# Patient Record
Sex: FEMALE | Race: WHITE
[De-identification: names, ages, dates, MRNs, and addresses within clinical notes are randomized per-mention and may not be internally consistent; named-entity substitution may affect disease eponyms.]

---

## 2017-01-22 ENCOUNTER — HOSPITAL ENCOUNTER (EMERGENCY)
Dept: HOSPITAL 80 - CED | Age: 72
Discharge: HOME | End: 2017-01-22
Payer: COMMERCIAL

## 2017-01-22 VITALS
DIASTOLIC BLOOD PRESSURE: 89 MMHG | OXYGEN SATURATION: 92 % | HEART RATE: 97 BPM | TEMPERATURE: 99 F | RESPIRATION RATE: 20 BRPM | SYSTOLIC BLOOD PRESSURE: 149 MMHG

## 2017-01-22 DIAGNOSIS — J06.9: ICD-10-CM

## 2017-01-22 DIAGNOSIS — H10.32: Primary | ICD-10-CM

## 2017-01-22 NOTE — UCPHY
H & P


Time Seen by Provider: 01/22/17 10:04


Patient Type: Established


HPI/ROS: 


This patient presents with a chief complaint of left eye irritation associated 

with purulent discharge which began yesterday.  Initially she had a foreign 

body sensation but this is resolved and she has also had some blurring which is 

also resolved.  5 or 6 days ago she developed cold consisting of nasal 

congestion, sinus pressure, sore throat, ear fullness and cough.  She has had 

some chills but does not think she has had a fever.  She admits to some mild 

shortness of breath but has no chest pain.





REVIEW OF SYSTEMS:


Constitutional:  Fatigue, malaise, chills, no fever


Eyes:  See above


ENT:  Sore throat, ear pressure, nasal congestion, sinus pressure


Respiratory:  Cough, mild shortness of breath


Cardiac:  No chest pain


Gastrointestinal:  Not addressed 


Genitourinary:  Not addressed


Musculoskeletal:  Myalgias


Skin:  No rash


Neurological:  Headache


Smoking Status: Never smoked


Physical Exam: 


GENERAL:  Well-appearing, well-nourished and in no acute distress. The patient 

is mildly hoarse


HEAD:  Atraumatic, normocephalic.


EYES:  Pupils equal round and reactive to light, extraocular movements intact, 

sclera anicteric, left conjunctiva is injected.  Examination with the 

ophthalmoscope showed no corneal abrasion and no foreign body.


ENT:  TMs normal, nares congested, oropharynx clear without exudates but with 

mild injection.  Moist mucous membranes.


NECK:  Normal range of motion, supple without lymphadenopathy or JVD.


LUNGS:  Breath sounds clear to auscultation bilaterally and equal.  No wheezes 

rales or rhonchi.


HEART:  Regular rate and rhythm 


EXTREMITIES:  Normal range of motion, no pitting or edema.  No clubbing or 

cyanosis.


NEUROLOGICAL:  Cranial nerves II through XII grossly intact.  Normal speech, 

normal gait.


PSYCH:  Normal mood, normal affect.


SKIN:  Warm, dry, normal turgor, no visible rashes or lesions.


Constitutional: 





 Initial Vital Signs











Temperature (C)  37.2 C   01/22/17 10:00


 


Heart Rate  97   01/22/17 10:00


 


Respiratory Rate  20   01/22/17 10:00


 


Blood Pressure  149/89 H  01/22/17 10:00


 


O2 Sat (%)  92   01/22/17 10:00








 











O2 Delivery Mode               Room Air














Allergies/Adverse Reactions: 


 





simvastatin [From Zocor] Allergy (Unknown, Verified 01/22/17 09:58)


 MUSCLE PAIN








Home Medications: 














 Medication  Instructions  Recorded


 


Ascorbic Acid [Vitamin C 500 mg 500 mg PO DAILY 03/27/13





(*)]  


 


Atorvastatin Calcium [Lipitor 10 10 mg PO HS 03/27/13





mg (*)]  


 


Levothyroxine Sodium [Levothroid] 50 mcg PO DAILY 03/27/13


 


Losartan Potassium [Cozaar 50 mg 50 mg PO DAILY 03/27/13





(*)]  


 


Omeprazole [Prilosec 20 mg] 20 mg PO DAILY 03/27/13


 


Pregabalin [LYRICA] 100 mg PO TID 03/27/13


 


Acyclovir [Zovirax 400 mg (*)] 800 mg PO BID 05/14/14


 


Zolpidem Tartrate [Ambien 5MG (*)] 5 mg PO HS PRN 05/21/14


 


Aspirin [Aspirin 81mg (*)] 81 mg PO BID #28 tab 06/06/14


 


Ambien 10 mg  07/30/14


 


Cozaar  07/30/14


 


Lipitor 10 mg (RX)  07/30/14


 


Lyrica  07/30/14


 


Omeprazole  07/30/14


 


Synthroid  07/30/14


 


Coq-10  03/31/16


 


Fish Oil  03/31/16


 


Multi-Day Vitamins  03/31/16


 


Vitamin K  03/31/16


 


Gentamicin 0.3% [Gentak 0.3%] 2 drops LEFTEYE Q4H #1 opht.btl 01/22/17














Medical Decision Making


Differential Diagnosis: 


I believe this patient has a conjunctivitis probably viral in origin.  I 

believe that this diagnosis explains her eye symptoms.





Departure





- Departure


Disposition: Home, Routine, Self-Care


Clinical Impression: 


Conjunctivitis


Qualifiers:


 Conjunctivitis type: acute Acute conjunctivitis type: unspecified Laterality: 

left Qualifier Code: (H10.32) Unspecified acute conjunctivitis, left eye





Upper respiratory infection


Qualifiers:


 URI type: unspecified URI Qualifier Code: (J06.9) Acute upper respiratory 

infection, unspecified


Condition: Good


Instructions:  Conjunctivitis (ED), Upper Respiratory Infection (ED)


Additional Instructions: 


If your symptoms improved in another 5 or 6 days you should be re-evaluated.  

Keep your previously scheduled appointment with ear nose and throat specialist.


Referrals: 


Kim Leiva MD [Primary Care Provider] - As per Instructions


Prescriptions: 


Gentamicin 0.3% [Gentak 0.3%] 2 drops LEFTEYE Q4H #1 opht.btl





- PQRS


PQRS Measurement: 


Not applicable

## 2017-03-15 ENCOUNTER — HOSPITAL ENCOUNTER (OUTPATIENT)
Dept: HOSPITAL 80 - BHFA | Age: 72
End: 2017-03-15
Attending: INTERNAL MEDICINE
Payer: COMMERCIAL

## 2017-03-15 DIAGNOSIS — E78.5: ICD-10-CM

## 2017-03-15 DIAGNOSIS — I47.1: Primary | ICD-10-CM

## 2017-03-15 DIAGNOSIS — I10: ICD-10-CM

## 2017-05-04 ENCOUNTER — HOSPITAL ENCOUNTER (OUTPATIENT)
Dept: HOSPITAL 80 - BMCIMAGING | Age: 72
End: 2017-05-04
Attending: INTERNAL MEDICINE
Payer: COMMERCIAL

## 2017-05-04 DIAGNOSIS — J45.909: Primary | ICD-10-CM

## 2017-05-17 ENCOUNTER — HOSPITAL ENCOUNTER (OUTPATIENT)
Dept: HOSPITAL 80 - FCATH | Age: 72
Setting detail: OBSERVATION
LOS: 1 days | Discharge: HOME | End: 2017-05-18
Attending: INTERNAL MEDICINE | Admitting: INTERNAL MEDICINE
Payer: COMMERCIAL

## 2017-05-17 DIAGNOSIS — I47.1: Primary | ICD-10-CM

## 2017-05-17 DIAGNOSIS — I10: ICD-10-CM

## 2017-05-17 DIAGNOSIS — Z94.84: ICD-10-CM

## 2017-05-17 DIAGNOSIS — G62.9: ICD-10-CM

## 2017-05-17 DIAGNOSIS — Z85.79: ICD-10-CM

## 2017-05-17 DIAGNOSIS — E78.5: ICD-10-CM

## 2017-05-17 DIAGNOSIS — E03.9: ICD-10-CM

## 2017-05-17 DIAGNOSIS — K21.9: ICD-10-CM

## 2017-05-17 LAB
% IMMATURE GRANULYOCYTES: 0.2 % (ref 0–1.1)
ABSOLUTE IMMATURE GRANULOCYTES: 0.01 10^3/UL (ref 0–0.1)
ABSOLUTE NRBC COUNT: 0 10^3/UL (ref 0–0.01)
ADD DIFF?: NO
ADD MORPH?: NO
ADD SCAN?: NO
ANION GAP SERPL CALC-SCNC: 7 MEQ/L (ref 8–16)
ANION GAP SERPL CALC-SCNC: 8 MEQ/L (ref 8–16)
APTT BLD: 26.9 SEC (ref 23–38)
ATYPICAL LYMPHOCYTE FLAG: 10 (ref 0–99)
CALCIUM SERPL-MCNC: 7.7 MG/DL (ref 8.5–10.4)
CALCIUM SERPL-MCNC: 8.8 MG/DL (ref 8.5–10.4)
CHLORIDE SERPL-SCNC: 107 MEQ/L (ref 97–110)
CHLORIDE SERPL-SCNC: 110 MEQ/L (ref 97–110)
CHOLEST SERPL-MCNC: 165 MG/DL (ref 140–220)
CHOLEST/HDLC SERPL: 3.17 RATIO (ref 1–4.44)
CO2 SERPL-SCNC: 24 MEQ/L (ref 22–31)
CO2 SERPL-SCNC: 27 MEQ/L (ref 22–31)
CREAT SERPL-MCNC: 0.8 MG/DL (ref 0.6–1)
CREAT SERPL-MCNC: 0.9 MG/DL (ref 0.6–1)
ERYTHROCYTE [DISTWIDTH] IN BLOOD BY AUTOMATED COUNT: 13 % (ref 11.5–15.2)
FRAGMENT RBC FLAG: 0 (ref 0–99)
GFR SERPL CREATININE-BSD FRML MDRD: > 60 ML/MIN/{1.73_M2}
GFR SERPL CREATININE-BSD FRML MDRD: > 60 ML/MIN/{1.73_M2}
GLUCOSE SERPL-MCNC: 143 MG/DL (ref 70–100)
GLUCOSE SERPL-MCNC: 88 MG/DL (ref 70–100)
HCT VFR BLD CALC: 35.8 % (ref 38–47)
HGB BLD-MCNC: 11.7 G/DL (ref 12.6–16.3)
HIGH DENSITY LIPOPROTEIN: 52 MG/DL (ref 40–85)
INR PPP: 0.98 (ref 0.83–1.16)
LDLC/HDLC SERPL: 1.65 RATIO (ref 1–3.22)
LEFT SHIFT FLG: 0 (ref 0–99)
LIPEMIA HEMOLYSIS FLAG: 80 (ref 0–99)
LOW DENSITY LIPOPROTEIN: 86 MG/DL (ref 80–100)
MAGNESIUM SERPL-MCNC: 2 MG/DL (ref 1.6–2.3)
MAGNESIUM SERPL-MCNC: 2.2 MG/DL (ref 1.6–2.3)
MCH RBC BLDCO QN: 34.9 PG (ref 27.9–34.1)
MCHC RBC AUTO-ENTMCNC: 32.7 G/DL (ref 32.4–36.7)
MCV RBC AUTO: 106.9 FL (ref 81.5–99.8)
NON-HIGH DENSITY LIPOPROTEIN: 113 MG/DL (ref 90–129)
NRBC-AUTO%: 0 % (ref 0–0.2)
PLATELET # BLD: 179 10^3/UL (ref 150–400)
PLATELET CLUMPS FLAG: 10 (ref 0–99)
PMV BLD AUTO: 10.2 FL (ref 8.7–11.7)
POTASSIUM SERPL-SCNC: 4 MEQ/L (ref 3.5–5.2)
POTASSIUM SERPL-SCNC: 4.1 MEQ/L (ref 3.5–5.2)
PROTHROMBIN TIME: 12.9 SEC (ref 12–15)
RBC # BLD AUTO: 3.35 10^6/UL (ref 4.18–5.33)
SODIUM SERPL-SCNC: 141 MEQ/L (ref 134–144)
SODIUM SERPL-SCNC: 142 MEQ/L (ref 134–144)
TRIGL SERPL-MCNC: 136 MG/DL (ref 35–135)
VERY LOW DENSITY LIPOPROTEINS: 27 MG/DL (ref 8–25)

## 2017-05-17 PROCEDURE — C1893 INTRO/SHEATH, FIXED,NON-PEEL: HCPCS

## 2017-05-17 PROCEDURE — 93653 COMPRE EP EVAL TX SVT: CPT

## 2017-05-17 PROCEDURE — 93655 ICAR CATH ABLTJ DSCRT ARRHYT: CPT

## 2017-05-17 PROCEDURE — 93005 ELECTROCARDIOGRAM TRACING: CPT

## 2017-05-17 PROCEDURE — 93662 INTRACARDIAC ECG (ICE): CPT

## 2017-05-17 PROCEDURE — C1730 CATH, EP, 19 OR FEW ELECT: HCPCS

## 2017-05-17 PROCEDURE — 93623 PRGRMD STIMJ&PACG IV RX NFS: CPT

## 2017-05-17 PROCEDURE — C1731 CATH, EP, 20 OR MORE ELEC: HCPCS

## 2017-05-17 PROCEDURE — 93306 TTE W/DOPPLER COMPLETE: CPT

## 2017-05-17 PROCEDURE — C1732 CATH, EP, DIAG/ABL, 3D/VECT: HCPCS

## 2017-05-17 PROCEDURE — 93621 COMP EP EVL L PAC&REC C SINS: CPT

## 2017-05-17 RX ADMIN — PREGABALIN SCH MG: 50 CAPSULE ORAL at 21:01

## 2017-05-17 NOTE — CPEKG
Heart Rate: 65

RR Interval: 923

P-R Interval: 144

QRSD Interval: 82

QT Interval: 396

QTC Interval: 412

P Axis: 21

QRS Axis: 23

T Wave Axis: 19

EKG Severity - NORMAL ECG -

EKG Impression: SINUS RHYTHM

Electronically Signed By: Christal Rowell 17-May-2017 10:01:56

## 2017-05-17 NOTE — EPPROC
Electrophysiology Procedure Note: 


ELECTROPHYSIOLOGIC STUDY AND CATHETER MEDIATED ABLATION OF  LEFT POSTEROLATERAL 

ACCESSORY PATHWAY AND TYPICAL AVNRT








Procedures performed:





15582-82    EP evaluation with RA/RV/LA pace/record, with arrhythmia induction





56153-97    EP evaluation with RA/RV pace record, insert/reposition catheter, 

with arrhythmia induction





67586   Intracardiac catheter ablation, SVT arrhythmogenic focus 





40031   3D mapping





2nd arrhythmia





Fluoroscopy





22976   Intracardiac echocardiogram





76310-00   Transseptal puncture








INDICATION:





Supraventricular tachycardia





PROCEDURE:





Catheters and anesthesia:





The patient arrived in the Electrophysiology Laboratory in the fasting state.  

The right clavicular region, right groin, and left groin area were prepped and 

draped in the usual sterile manner.  Anesthesiologist Dr. Jason Garner 

administered general anesthesia.  Appropriate non-invasive blood pressure, 

pulse oximetry and end-tidal CO2 monitoring was established.


All catheters were placed percutaneously using the modified Seldinger technique

, and advanced into position under fluoroscopic guidance. One #6 Dominican 

hexapolar non-deflectable electrode catheter was inserted into the right atrial 

appendage via the left femoral vein (2mm spacing; except the proximal ring 

which was 25cm from the tip  used for unipolar recordings).  One #7 Dominican 

deflectable octapolar electrode catheter was advanced to the His-bundle 

position via the left femoral vein (2mm spacing).  One #7 Dominican deflectable 

quadrapolar catheter was advanced to the anteroseptal right ventricle via the 

left femoral vein.  One #7 Dominican deflectable catheter with 10 pairs of 

electrodes was placed via the right femoral vein into the coronary sinus.   


Programmed stimulation of the right atrium, right ventricle and coronary sinus (

left atrium) was performed. Parahisian pacing demonstrated two patterns of 

retrograde atrial activation during His bundle capture and loss of His bundle 

capture.   This demonstrated the presence of an accessory pathway ( 0500 oclock 

at the mitral annulus as seen in the Croatian view).   Orthodromic AV reentrant 

tachycardia was induced during isoproterenol infusion 2 mics per minute.  

Ventricular extrastimuli delivered during tachycardia during His bundle 

refractoriness terminated the tachycardia to be orthodromic AV reentrant 

tachycardia.  





Typical AVNRT was also induced, VA interval was 0 milliseconds during 

tachycardia.  Tachycardia started with long AH interval.  There was 

intermittent 2-1 conduction during AVNRT confirming that this was not 

orthodromic AVRT.





In preparation for ablation of the left posterior accessory pathway a 

transeptal puncture was performed under fluoroscopic and hemodynamic guidance. 

Intracardiac echocardiography was used during the procedure.  Mean left atrial 

pressure was 18 mm Hg mmHg.  A SL1 sheath was inserted into the left atrium. 





The patient was administered IV heparin bolus and IV heparin continuous 

infusion prior to the transseptal puncture and the activated clotting time was 

maintained ~ 300 seconds during the remainder of the procedure.  





One #7 Dominican mapping catheter with a 4 mm tip electrode and a sensor  for the 

[id number] mapping system was inserted into the SL1 sheath and advanced to the 

left atrium.  Mapping was perfomed during ventricular pacing.  A sharp 

retrograde grade accessory pathway potential was recorded at 0500 oclock at the 

mitral annulus as seen in the [place] view.  This preceded the earliest atrial 

activation by 10 ms.   RF#1 was delivered to this site.   RF#1 blocked 

conduction in the accessory pathway after 8 seconds of initiation of ablation.  

There was change in activation pattern in the coronary sinus and a 2nd RF 

ablation slightly more anterior to the site of our phone terminated all 

conduction over the accessory pathway Additional RF 3-5 were delivered slightly 

anterior and posterior to RF1 site.





Following this attention was directed to ablation for AVNRT.  Sheath and 

catheter were withdrawn into the right atrium.  Ablation was performed at the 

upper edge of the coronary sinus and low mid septal tricuspid annulus.  

Junctional rhythm occurred continuously.  There was no slow pathway conduction 

post ablation.





Programmed stimulation from the RA, CS, right  ventricle during the baseline 

state post ablation and during infusion of isoproterenol 2 and 4 mcg/min 

confirmed that there was no conduction over the left posterior lateral 

accessory pathway and no orthodromic AVRT or AVNRT could be induced.   





The catheters were removed.  Long sheath was exchanged for short sheath.  

Protamine was administered.  The patient was transferred to the cardiovascular 

holding area in stable condition.  Vascular access sheaths were removed in the 

holding area.  There were no apparent complications.








Results:





A.   Spontaneous Intervals:


Pre ablation    SCL 12 90 ms AH 65 ms HV 40 ms


Post ablation   ms AH 55 ms HV 40 ms





B.   Antegrade AV zackary function (decremental pacing)


Pre ablation    FPERP 390 ms SPERP 380 ms WBB CL 3 70 ms


Post ablation    FPERP 380 ms     WBB  ms





C.   Retrograde AV zackary function (decremental pacing)


 Post ablation    FPERP 380 ms   WBB  ms





D.   Accessory pathway function


1.   A single AV accessory pathway was identified at the mitral annulus at 0500 

oclock as seen in the Croatian view.


2.   The AV accessory pathway conducted in the antegrade and retrograde 

directions.  During ventricular pacing 1:1 conduction over the accessory 

pathway was maintained to a cycle length of 330 ms, block over the AP occurred 

at 320 ms.





E.   Arrhythmias:





1.   Orthodromic AVRT   ms  ms HV 40 ms  ms, shortest VA at 

posterolateral  ms


2.   Typical AVNRT   ms  ms, HA 45 ms VA interval 0 ms





CONCLUSIONS:





1.   A single left posterolateral accessory pathway, which exhibited conduction 

in the retrograde direction.


2.   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 

accessory pathway.


3.   Successful ablation of the left posterolateral accessory pathway with 

elimination of AV reentrant tachycardia.


4.   Typical AV zackary reentry tachycardia.  Successful ablation of slow AV 

zackary pathway and elimination of antegrade conduction over slow AV zackary 

pathway and inducibility of AVNRT.  


5.   No apparent complications.








Patient Problems: 


 Problems











Problem Status Onset


 


SVT (supraventricular tachycardia) Acute  


 


Primary osteoarthritis of left knee Acute

## 2017-05-17 NOTE — CPEKG
Heart Rate: 71

RR Interval: 845

P-R Interval: 140

QRSD Interval: 82

QT Interval: 400

QTC Interval: 435

P Axis: 39

QRS Axis: 28

T Wave Axis: 19

EKG Severity - NORMAL ECG -

EKG Impression: SINUS RHYTHM

Electronically Signed By: Christal Rowell 17-May-2017 15:44:21

## 2017-05-18 VITALS
SYSTOLIC BLOOD PRESSURE: 127 MMHG | TEMPERATURE: 98 F | DIASTOLIC BLOOD PRESSURE: 72 MMHG | OXYGEN SATURATION: 96 % | HEART RATE: 68 BPM | RESPIRATION RATE: 20 BRPM

## 2017-05-18 LAB
% IMMATURE GRANULYOCYTES: 0.3 % (ref 0–1.1)
ABSOLUTE IMMATURE GRANULOCYTES: 0.03 10^3/UL (ref 0–0.1)
ABSOLUTE NRBC COUNT: 0 10^3/UL (ref 0–0.01)
ADD DIFF?: NO
ADD MORPH?: NO
ADD SCAN?: NO
ANION GAP SERPL CALC-SCNC: 6 MEQ/L (ref 8–16)
ATYPICAL LYMPHOCYTE FLAG: 10 (ref 0–99)
CALCIUM SERPL-MCNC: 8.2 MG/DL (ref 8.5–10.4)
CHLORIDE SERPL-SCNC: 109 MEQ/L (ref 97–110)
CO2 SERPL-SCNC: 24 MEQ/L (ref 22–31)
CREAT SERPL-MCNC: 0.8 MG/DL (ref 0.6–1)
CREATINE KINASE-MB FRACTION: 2 NG/ML (ref 0–3.19)
ERYTHROCYTE [DISTWIDTH] IN BLOOD BY AUTOMATED COUNT: 12.6 % (ref 11.5–15.2)
FRAGMENT RBC FLAG: 0 (ref 0–99)
GFR SERPL CREATININE-BSD FRML MDRD: > 60 ML/MIN/{1.73_M2}
GLUCOSE SERPL-MCNC: 111 MG/DL (ref 70–100)
HCT VFR BLD CALC: 29.7 % (ref 38–47)
HGB BLD-MCNC: 9.7 G/DL (ref 12.6–16.3)
LEFT SHIFT FLG: 0 (ref 0–99)
LIPEMIA HEMOLYSIS FLAG: 80 (ref 0–99)
MCH RBC BLDCO QN: 35 PG (ref 27.9–34.1)
MCHC RBC AUTO-ENTMCNC: 32.7 G/DL (ref 32.4–36.7)
MCV RBC AUTO: 107.2 FL (ref 81.5–99.8)
NRBC-AUTO%: 0 % (ref 0–0.2)
PLATELET # BLD: 160 10^3/UL (ref 150–400)
PLATELET CLUMPS FLAG: 0 (ref 0–99)
PMV BLD AUTO: 10.7 FL (ref 8.7–11.7)
POTASSIUM SERPL-SCNC: 4.8 MEQ/L (ref 3.5–5.2)
RBC # BLD AUTO: 2.77 10^6/UL (ref 4.18–5.33)
SODIUM SERPL-SCNC: 139 MEQ/L (ref 134–144)
TROPONIN I SERPL-MCNC: 0.88 NG/ML (ref 0–0.03)

## 2017-05-18 RX ADMIN — PREGABALIN SCH MG: 50 CAPSULE ORAL at 08:06

## 2017-05-18 NOTE — GDS
[f 
rep st]



                                                             DISCHARGE SUMMARY





ADMISSION DIAGNOSES:  

1.  Supraventricular tachycardia.

2.  Hypertension.

3.  Hyperlipidemia.

4.  Peripheral neuropathy.



DISCHARGE DIAGNOSES:  

1.  Supraventricular tachycardia, status post electrophysiology study and with 
successful ablation of left posterolateral accessary pathway which eliminated 
arteriovenous reentry tachycardia. Patient also had a successful ablation of 
slow arteriovenous zackary pathway, eliminating an anterograde conduction over 
slow arteriovenous zackary pathway, inducible of arteriovenous zackary reentrant 
tachycardia.

2.  Hypertension.

3.  Hyperlipidemia.

4.  Peripheral neuropathy.



PROCEDURES DONE DURING HOSPITALIZATION:  

1.  Electrocardiogram.

2.  Electrophysiology study.

3.  Successful ablation of left posterior accessary pathway, which eliminated 
arteriovenous reentry tachycardia.

4.  Successful ablation of slow arteriovenous zackary pathway and elimination of 
antegrade conduction over slow arteriovenous zackary pathway and inducible 
arteriovenous zackary reentrant tachycardia.

5.  Echocardiogram.



BRIEF HISTORY:  Please see H and P. The patient is a 71-year-old female who has 
had known episodes of SVT since 2004. She had been tried on beta blockers in 
the past, reporting fatigue symptoms, and discontinued. She had recently had 
another episode of SVT, lasting for 1-1/2 hours. Paramedics were called, she 
did have significant shortness of breath and dizziness with them. It was 
recorded by paramedic service that her heart rate was 185 beats per minute. 
Adenosine was given.



PAST MEDICAL HISTORY:  She was evaluated by Dr. Tyson, who discussed all options, 
including medical management versus ablation. Risks and benefits were 
discussed. Patient decided to undergo ablation.



HOSPITAL COURSE:  Patient was admitted through CVC, prepped for procedure, and 
taken to the electrophysiology suite. There, Dr. Tyson performed EP procedure, 
noticing a single left posterior lateral axis pathway which exhibited 
conduction in retrograde direction,   AV reentry tachycardia using left 
posterior lateral accessary pathway, and typical AV zackary reentry tachycardia. 
At that point, procedure was switched to intervention, in which Dr. Tyson was 
able to successfully ablate the left posterior accessary pathway, which 
eliminated the AV reentry tachycardia, and successfully ablated the slow AV 
zackary pathway, eliminating the antegrade conduction over slow AV zackary pathway 
and inducible AVNRT. No apparent complications. Patient was taken back to the 
CVC and ultimately to the PCU overnight.Patient reports, since arrival to the 
floor, she has had no chest pain or shortness of breath. She has been up and 
walking in the unit without any difficulties. She denies any palpitations, 
orthopnea, lightheadedness, near-syncope, or syncopal events. Continuous 
cardiac monitor shows she has been in sinus rhythm with no malignant 
arrhythmias or pauses.



PHYSICAL EXAMINATION:  GENERAL APPEARANCE: Today thin, well-groomed,  
female. She is alert and oriented to person, place, time, and situation. 
Appears to be under no acute distress. VITAL SIGNS: Current vital signs are 
blood pressure of 127/72, heart rate 68, respirations 20, saturation 96 in room 
air, and temperature 36.7 degrees Celsius. HEENT: Head is normocephalic. Lips 
and tongue are pink and moist with no signs of cyanosis. Conjunctivae pink. NECK
: Trachea is midline, +2 carotid pulses bilateral. No auscultated bruits, no 
jugular vein distention. RESPIRATORY: Lungs clear to auscultation. No rhonchi, 
rales or wheezes. No accessory muscle use. No intercostal muscle retraction 
noted. CARDIAC: Regular rate, regular rhythm. S1, S2. No S3, S4, gallops, rubs, 
or murmur. ABDOMEN: Soft, nontender. Bowel sounds x4 quadrants. No 
organomegaly. No palpable masses. SKIN: Pink, warm, dry. No cyanosis. No 
clubbing. No peripheral edema. VASCULAR: +2 carotids bilateral, +2 radials 
bilateral, +2 posterior tibial pulses bilateral. SKIN: Pink. Catheter insertion 
site, bilateral groin sites, with no redness, swelling, drainage, ecchymosis, 
or hematoma. No auscultated bruits.



LABORATORY STUDIES (Drawn today): WBC of 8.72, hemoglobin 9.7, hematocrit of 
29.7, platelet count 160. Sodium 139, potassium 4.8, chloride 109, CO2 24, BUN 
14, creatinine 0.8, glucose 111, calcium 8.2. CK 56, CK-MB 2.0, troponin 0.881. 
Note, expected to have elevated cardiac enzymes status post ablation procedure.



STUDIES:  Electrophysiology and ablation procedures as mentioned above. Morning 
electrocardiogram shows sinus rhythm, normal axis; no ST or T-wave 
abnormalities suggestive of ischemia. Morning echocardiogram showed normal LV 
systolic, ejection fraction 70% to 75%, with no wall motion abnormalities. 
Diastolic dysfunction was present. Normal RV size and function. Mild LA 
dilation with normal RA dimensions. Mild MR, moderate TR. RVSP estimated at 35-
40 mmHg. Borderline dilation of ascending aorta at 3.4 cm. No pericardial 
effusion.



DISCHARGE DISPOSITION:  Patient will be discharged home in stable condition. 
She is under activity restrictions of not lifting more than 10 pounds for the 
next week and no strenuous activity for the next 2 weeks.



DISCHARGE MEDICATIONS:  Please see discharge medication reconciliation sheet.



DISCHARGE INSTRUCTIONS:  Post electrophysiology status post SVT ablation 
discharge instructions went over with the patient, including monitoring for 
signs of infection, bleeding precautions, activity restrictions, and DVT 
precautions. At this time, the patient and friend verbalize understanding of 
all instructions. No further questions at this time. They have been told that 
if there any problems or concerns that come up post discharge, they are to 
notify our office or seek medical attention immediately.



TOTAL TIME SPENT ON DISCHARGE:  Greater than 30 minutes.





Job #:  [id number] and [id number]

MTDD

## 2017-05-18 NOTE — CPEKG
Heart Rate: 79

RR Interval: 759

P-R Interval: 132

QRSD Interval: 86

QT Interval: 376

QTC Interval: 432

P Axis: 4

QRS Axis: 52

T Wave Axis: 26

EKG Severity - NORMAL ECG -

EKG Impression: SINUS RHYTHM

Electronically Signed By: Christal Rowell 18-May-2017 12:10:49

## 2017-05-18 NOTE — ECHO
[id number]

[id number]



+---------------------------------------------------+      [address]

:                                                   :       [place] [address]

:                                                   :           [phone]

+---------------------------------------------------+       Fax [phone]



                       Adult Echocardiographic Report



+----------------------------------------------------------------------------

------+

:Name: CASA HWANG LStudy Date: 2017 08:13 AM                       

      :

:MRN: [id number]       [facility]: [id number]

n: [facility]:

:: 1945       Gender: Female                         Height: 64 in  

      :

:Age: 71 yrs           Race: WH                               Weight: 123 lb 

      :

:Reason For Study: F/U post EP study                                         

      :

:                                                             BSA: 1.6 meters

2     :

+----------------------------------------------------------------------------

------+

MMode/2D Measurements \T\ Calculations

IVSd: 0.87 cm       LVIDd: 4.3 cm      FS: 42.1 %        Ao root diam:

LVPWd: 0.85 cm      LVIDs: 2.5 cm      EDV(Teich):       3.4 cm

                                       82.3 ml           LA dimension:

                                       ESV(Teich):       3.1 cm

                                       21.8 ml

                                       EF(Teich): 73.5 %

        _____________________________________________________________



LVLd ap4: 6.7 cm    SV(MOD-sp4):

EDV(MOD-sp4):       34.0 ml

53.0 ml

LVLs ap4: 5.2 cm

ESV(MOD-sp4):

19.0 ml

EF(MOD-sp4): 64.2 %



Normal Measurement Values:

+----------------------------------------------------------------------------

----------------------------------+

:LVIDd (3.5-5.7cm)    IVSd (0.6-1.1cm)     LVPWd (0.6-1.1cm)     Aortic Root 

(2.0-3.7cm)Left Atrium (1.5-4.0cm):

:LV Vol(d) (76-115ml) LV Vol(s) (29-48ml)  Ejec Fraction (50-65%)PV Anuj (0.6-

1.2m/s)    TV Anuj (0.4-1.0m/s)    :

:MV E Anuj (0.8-1.0m/s)MV A Anuj (0.3-1.0m/s)LVOT Anuj (0.7-1.2m/s) Asc Ao Anuj (

0.9-1.8m/s)                       :

+----------------------------------------------------------------------------

----------------------------------+

Doppler Measurements \T\ Calculations

MV E max anuj: 74.5 cm/sec  Ao V2 max: 134.2 cm/sec TR max anuj: 281.0 cm/sec

MV A max anuj: 103.2 cm/sec Ao max P.2 mmHg     TR max P.6 mmHg

MV E/A: 0.72                                       RAP systole: 5.0 mmHg

                                                   RVSP(TR): 36.6 mmHg



Left Ventricle

The left ventricle is normal in size. There is normal left ventricular wall

thickness. Left ventricular systolic function is normal. Ejection Fraction =

70-75%. The left ventricular ejection fraction is calculated at 73.5 %.

There is Doppler evidence for diastolic dysfunction. No regional wall motion

abnormalities noted.





Right Ventricle

The right ventricle is normal in size and function.



Atria

The left atrium is mildly dilated. Right atrial size is normal.





Mitral Valve

The mitral valve is normal in structure and function. There is mild mitral

regurgitation.



Tricuspid Valve

Normal tricuspid valve. There is moderate tricuspid regurgitation. Right

ventricular systolic pressure is 37-42mmHg.



Aortic Valve

The aortic valve is trileaflet. The aortic valve opens well. There is no

aortic stenosis. There is no aortic insufficiency.



Pulmonic Valve

The pulmonic valve is not well visualized. Mild pulmonic valvular

regurgitation.



Great Vessels

The aortic root is normal size. Borderline dilated ascending aorta.





Pericardium/Pleural

There is no pericardial effusion.



Conclusion

A complete two-dimensional transthoracic echocardiogram was performed (2D,

M-mode, Doppler and color flow Doppler).



(1) Left ventricular systolic ejection fraction was normal (70-75%)

- normal wall motion

(2) No left ventricular hypertrophy

(3) Diastolic dysfunction was present

(4) Normal right ventricular size and function

(5) Mild left atrial dilation with normal right atrial dimensions

(6) Mild mitral regurgitation

(7) Trileaflet aortic valve without sclerosis or insufficiency

(8) Moderate tricuspid regurgitation

- RVSP was estimated to be 35-40 mm Hg

(9) Poor visualization of the pulmonic valve with physiologic insufficiency

by doppler

(10) Borderline dilation of the ascending aorta (3.4 cm)

(11) No comparison echocardiograms





_____________________________________________________________________________







Final Reading Physician:

                        Jud Bartholomew signed on 2017 04:13 PM

Ordering Physician: Jamie Tyson

Performed By: Massiel Mobley, CS

## 2017-06-05 ENCOUNTER — HOSPITAL ENCOUNTER (OUTPATIENT)
Dept: HOSPITAL 80 - BMCIMAGING | Age: 72
End: 2017-06-05
Attending: INTERNAL MEDICINE
Payer: COMMERCIAL

## 2017-06-05 DIAGNOSIS — Z13.820: Primary | ICD-10-CM

## 2017-06-21 ENCOUNTER — HOSPITAL ENCOUNTER (OUTPATIENT)
Dept: HOSPITAL 80 - BHFA | Age: 72
End: 2017-06-21
Attending: INTERNAL MEDICINE
Payer: COMMERCIAL

## 2017-06-21 DIAGNOSIS — I47.1: Primary | ICD-10-CM

## 2017-08-10 ENCOUNTER — HOSPITAL ENCOUNTER (OUTPATIENT)
Dept: HOSPITAL 80 - FIMAGING | Age: 72
End: 2017-08-10
Attending: INTERNAL MEDICINE
Payer: COMMERCIAL

## 2017-08-10 DIAGNOSIS — Z80.3: ICD-10-CM

## 2017-08-10 DIAGNOSIS — Z12.31: Primary | ICD-10-CM

## 2017-08-10 PROCEDURE — G0202 SCR MAMMO BI INCL CAD: HCPCS

## 2018-06-26 ENCOUNTER — HOSPITAL ENCOUNTER (OUTPATIENT)
Dept: HOSPITAL 80 - FIMAGING | Age: 73
End: 2018-06-26
Attending: PHYSICAL MEDICINE & REHABILITATION
Payer: COMMERCIAL

## 2018-06-26 DIAGNOSIS — M99.73: ICD-10-CM

## 2018-06-26 DIAGNOSIS — M12.88: ICD-10-CM

## 2018-06-26 DIAGNOSIS — M51.36: ICD-10-CM

## 2018-06-26 DIAGNOSIS — S33.130A: ICD-10-CM

## 2018-06-26 DIAGNOSIS — M41.86: Primary | ICD-10-CM

## 2018-06-26 DIAGNOSIS — M51.26: ICD-10-CM

## 2018-06-26 DIAGNOSIS — M48.061: ICD-10-CM

## 2018-08-22 ENCOUNTER — HOSPITAL ENCOUNTER (OUTPATIENT)
Age: 73
End: 2018-08-22
Payer: COMMERCIAL

## 2018-09-26 ENCOUNTER — HOSPITAL ENCOUNTER (OUTPATIENT)
Dept: HOSPITAL 80 - BHFA | Age: 73
End: 2018-09-26
Attending: INTERNAL MEDICINE
Payer: COMMERCIAL

## 2018-09-26 DIAGNOSIS — R94.39: ICD-10-CM

## 2018-09-26 DIAGNOSIS — R07.9: Primary | ICD-10-CM

## 2018-09-26 PROCEDURE — A9500 TC99M SESTAMIBI: HCPCS

## 2018-09-26 PROCEDURE — 93017 CV STRESS TEST TRACING ONLY: CPT

## 2018-09-26 PROCEDURE — 78452 HT MUSCLE IMAGE SPECT MULT: CPT

## 2018-10-22 ENCOUNTER — HOSPITAL ENCOUNTER (OUTPATIENT)
Dept: HOSPITAL 80 - FIMAGING | Age: 73
End: 2018-10-22
Attending: NURSE PRACTITIONER
Payer: COMMERCIAL

## 2018-10-22 DIAGNOSIS — Z98.1: ICD-10-CM

## 2018-10-22 DIAGNOSIS — M54.2: Primary | ICD-10-CM

## 2018-10-25 ENCOUNTER — HOSPITAL ENCOUNTER (OUTPATIENT)
Dept: HOSPITAL 80 - FIMAGING | Age: 73
End: 2018-10-25
Attending: INTERNAL MEDICINE
Payer: COMMERCIAL

## 2018-10-25 DIAGNOSIS — R05: Primary | ICD-10-CM

## 2018-12-14 ENCOUNTER — HOSPITAL ENCOUNTER (OUTPATIENT)
Dept: HOSPITAL 80 - FIMAGING | Age: 73
End: 2018-12-14
Attending: INTERNAL MEDICINE
Payer: COMMERCIAL

## 2018-12-14 DIAGNOSIS — I25.10: Primary | ICD-10-CM

## 2018-12-14 DIAGNOSIS — I47.1: ICD-10-CM

## 2018-12-14 DIAGNOSIS — I10: ICD-10-CM

## 2018-12-14 PROCEDURE — 75574 CT ANGIO HRT W/3D IMAGE: CPT

## 2019-03-06 ENCOUNTER — HOSPITAL ENCOUNTER (OUTPATIENT)
Dept: HOSPITAL 80 - FCATH | Age: 74
Setting detail: OBSERVATION
LOS: 1 days | Discharge: HOME | End: 2019-03-07
Attending: INTERNAL MEDICINE | Admitting: INTERNAL MEDICINE
Payer: COMMERCIAL

## 2019-03-06 DIAGNOSIS — I10: ICD-10-CM

## 2019-03-06 DIAGNOSIS — E78.5: ICD-10-CM

## 2019-03-06 DIAGNOSIS — I25.119: Primary | ICD-10-CM

## 2019-03-06 LAB
INR PPP: 1 (ref 0.83–1.16)
PLATELET # BLD: 227 10^3/UL (ref 150–400)
PROTHROMBIN TIME: 12.8 SEC (ref 12–15)

## 2019-03-06 PROCEDURE — G0378 HOSPITAL OBSERVATION PER HR: HCPCS

## 2019-03-06 PROCEDURE — C1874 STENT, COATED/COV W/DEL SYS: HCPCS

## 2019-03-06 PROCEDURE — 93458 L HRT ARTERY/VENTRICLE ANGIO: CPT

## 2019-03-06 PROCEDURE — C1769 GUIDE WIRE: HCPCS

## 2019-03-06 PROCEDURE — 93005 ELECTROCARDIOGRAM TRACING: CPT

## 2019-03-06 PROCEDURE — C9600 PERC DRUG-EL COR STENT SING: HCPCS

## 2019-03-06 PROCEDURE — C1887 CATHETER, GUIDING: HCPCS

## 2019-03-06 PROCEDURE — C1760 CLOSURE DEV, VASC: HCPCS

## 2019-03-06 PROCEDURE — C1725 CATH, TRANSLUMIN NON-LASER: HCPCS

## 2019-03-06 PROCEDURE — 93571 IV DOP VEL&/PRESS C FLO 1ST: CPT

## 2019-03-06 PROCEDURE — G0379 DIRECT REFER HOSPITAL OBSERV: HCPCS

## 2019-03-06 RX ADMIN — ACYCLOVIR SCH MG: 400 TABLET ORAL at 21:42

## 2019-03-06 RX ADMIN — TICAGRELOR SCH MG: 90 TABLET ORAL at 21:42

## 2019-03-06 NOTE — PDGENHP
History & Physical


Chief Complaint: chest pain


History of Present Illness: Pt is a 74 yo, f with history of HTN and 

hyperlipidemia who presented with symptoms of chest pain on exertion c/w 

angina.  She had a stress test performed demonstrating an anterior perfusion 

defect.  CT angiogram was notable for an intermediate grade LAD lesion.  Pt 

continues to have angina despite GDMT.


Pertinent Past, Social, Family History: No smoking.  rare ETOH


Relevant Physical Exam: gen - A, A, Ox3.  lungs - CTAB.  CV - RRR, S1, S3.  ext 

- No edema


Cardiorespiratory Assessment: Pt presents with exertional cp c/w angina.  She 

has an abnormal stress test with anterior ischemia.  Pt conitnues to have 

symptoms of medical therapy.  Angiogram for risk stratification.

## 2019-03-06 NOTE — CPEKG
Test Reason : OPEN

Blood Pressure : ***/*** mmHG

Vent. Rate : 058 BPM     Atrial Rate : 058 BPM

   P-R Int : 152 ms          QRS Dur : 091 ms

    QT Int : 478 ms       P-R-T Axes : 032 032 027 degrees

   QTc Int : 470 ms

 

Sinus rhythm

 

Confirmed by Jacky Samuel ([id number]) on 3/6/2019 12:24:08 PM

 

Referred By: Jacky Samuel           Confirmed By:Jacky Samuel

## 2019-03-06 NOTE — CPIP
[f rep st]



                                                       INVASIVE CARDIAC PROCEDURE





DATE OF PROCEDURE:  03/06/2019



PROCEDURES:  

1.  Coronary angiography.

2.  Left ventriculography.  

3.  FFR of left anterior descending coronary artery.  

4.  Stenting of left anterior descending coronary artery.



INDICATIONS:  

1.  Angina despite guideline directed medical therapy.

2.  Abnormal nuclear stress test with anterior ischemia.



ACCESS:  Patient was prepped and draped in sterile fashion.  1% lidocaine was used to anesthetize the
 right inguinal region.  A 6-B[place]wan[place] introducer sheath was placed selectively in the right common femo
ral artery via modified Seldinger technique.



CORONARY ANGIOGRAPHY:  A 6-B[place]wanan JL4 was advanced to left main coronary artery and images obtained. 
 The left main coronary artery trifurcated into an LAD, ramus, and circumflex coronary arteries.  The
 left main coronary artery appears normal.  The left anterior descending coronary artery had a proxim
al lesion that was intermediate in severity approaching 70% to 80%.  The ramus coronary artery is a l
arge vessel.  The ramus coronary artery appeared free of any significant disease.  The circumflex cor
onary artery is a moderate-sized vessel.  The circumflex coronary artery had mild luminal irregularit
ies throughout.  There was no stenosis greater than 10%.  A 6-B[place]wanan JR4 was advanced to the right co
ronary artery and images obtained.  The right coronary artery is dominant.  The right coronary artery
 had a proximal 20% stenosis present.



LEFT VENTRICULOGRAPHY:  A 6-B[place]wanan pigtail catheter was advanced in the left ventricle and images obt
ained.  Left ventricle is normal in size and had hyperdynamic systolic function.  Estimated ejection 
fraction was 70%.  Left ventricular end-diastolic pressure was 17 mmHg.  FFR of the left anterior jose
cending coronary artery:  A 6-B[place]wanan JL4 catheter was advanced to the left main coronary artery and i
mages obtained.  The FFR wire was advanced into the mid left anterior descending coronary artery and 
position verified by angiography.  FFR was obtained after IV adenosine infusion.  FFR was 0.79, indic
ating flow limitation.  The FFR wire was then withdrawn back to the catheter.  There was no evidence 
of drift.  Percutaneous coronary intervention of the left anterior descending coronary artery:  A Lug
e wire was placed in the distal vessel and position verified by angiography.  A 2.5 x 16 Synergy drug
-eluting stent was then placed across the lesion and deployed.  Followup angiography demonstrated JALEEL
I-3 flow with incomplete stent expansion.  The stent was post dilated with a 2.75 x 12 noncompliant b
alloon.  Followup angiography demonstrated SHIV-3 flow.  No residual stenosis.



COMPLICATIONS:  None.



CONCLUSIONS:  

1.  Single-vessel coronary artery disease.

2.  Normal left ventricular size and systolic function.

3.  Flow-limiting disease in the proximal left anterior descending coronary artery by FFR.

4.  Status post successful stenting of the left anterior descending coronary artery with Synergy drug
-eluting stent.





Job #:  [id number]

## 2019-03-06 NOTE — CPEKG
Test Reason : OPEN

Blood Pressure : ***/*** mmHG

Vent. Rate : 074 BPM     Atrial Rate : 074 BPM

   P-R Int : 140 ms          QRS Dur : 089 ms

    QT Int : 390 ms       P-R-T Axes : 013 002 -01 degrees

   QTc Int : 433 ms

 

Sinus rhythm

Low voltage, precordial leads

 

Confirmed by Jacky Samuel ([id number]) on 3/6/2019 1:26:17 PM

 

Referred By: Jacky Samuel           Confirmed By:Jacky Samuel

## 2019-03-07 VITALS — DIASTOLIC BLOOD PRESSURE: 75 MMHG | SYSTOLIC BLOOD PRESSURE: 136 MMHG

## 2019-03-07 LAB — PLATELET # BLD: 211 10^3/UL (ref 150–400)

## 2019-03-07 RX ADMIN — TICAGRELOR SCH MG: 90 TABLET ORAL at 08:04

## 2019-03-07 RX ADMIN — ACYCLOVIR SCH MG: 400 TABLET ORAL at 08:04

## 2019-03-07 NOTE — ASDISCHSUM
----------------------------------------------

Discharge Information

----------------------------------------------

Plan Status:Home with No Needs                       Medically Cleared to Leave:03/06/2019

Discharge Date:03/06/2019                            CM D/C Disposition:Home, Routine, Self-Care

ADT D/C Disposition:                                 Projected Discharge Date:03/06/2019

Transportation at D/C:Family                         Discharge Delay Reason:

Follow-Up Date:03/06/2019                            Discharge Slot:

Final Diagnosis:

----------------------------------------------

Placement Information

----------------------------------------------

----------------------------------------------

Patient Contact Information

----------------------------------------------

Contact Name:KATLIN                             Relationship:Friend

Address:                                             Home [phone]

                                                     Work Phone:

City:                                                [facility] Phone:

State/Zip Code:                                      Email:

----------------------------------------------

Financial Information

----------------------------------------------

Financial Class:Medicare

Primary Plan Desc:MEDICARE OUTPATIENT                Primary Plan Number:[id number]

Secondary Plan Desc:[employer]        Secondary Plan Number:[id number]

 

 

----------------------------------------------

Assessment Information

----------------------------------------------

----------------------------------------------

LACE

----------------------------------------------

LACE

 

Length of stay for            Answers:  Less than 1 day                       

current admission                                                             

Acuity / Level of             Answers:  No                                    

Care: Did the patient                                                         

have an inpatient                                                             

admission?                                                                    

Comorbidities - select        Answers:  Opioid dependence                     

all that apply                          / Chronic pain                        

                                        Other                         Notes:  HTN; HLD

# of Emergency department     Answers:  0                                     

visits in the last 6                                                          

months                                                                        

Score: 5

 

Date Signed:  03/07/2019 10:11 AM

Electronically Signed By:Alicia Emmanuel RN

 

 

----------------------------------------------

Intervention Information

----------------------------------------------

## 2019-03-07 NOTE — ASMTLACE
LACE

 

Length of stay for            Answers:  Less than 1 day                       

current admission                                                             

Acuity / Level of             Answers:  No                                    

Care: Did the patient                                                         

have an inpatient                                                             

admission?                                                                    

Comorbidities - select        Answers:  Opioid dependence                     

all that apply                          / Chronic pain                        

                                        Other                         Notes:  HTN; HLD

# of Emergency department     Answers:  0                                     

visits in the last 6                                                          

months                                                                        

Score: 5

 

Date Signed:  03/07/2019 10:11 AM

Electronically Signed By:Alicia Emmanuel RN

## 2019-03-07 NOTE — CPEKG
Test Reason : OPEN

Blood Pressure : ***/*** mmHG

Vent. Rate : 065 BPM     Atrial Rate : 064 BPM

   P-R Int : 134 ms          QRS Dur : 093 ms

    QT Int : 393 ms       P-R-T Axes : 017 010 023 degrees

   QTc Int : 409 ms

 

Sinus rhythm

 

Confirmed by Jacky Samuel ([id number]) on 3/7/2019 4:34:04 PM

 

Referred By: Jacky Samuel           Confirmed By:Jacky Samuel

## 2019-03-07 NOTE — GDS
[f rep st]



                                                             DISCHARGE SUMMARY





DISCHARGE DIAGNOSES:  

1.  Coronary artery disease.  Patient presented with exertional chest tightness consistent with angin
a.  She had a stress test performed demonstrating a small area of anterior ischemia.  Her Zarate treadm
ill score placed her at intermediate cardiovascular risk.  She was taken to the cardiac catheterizati
on laboratory for risk stratification.  Coronary angiography was notable for a 70% to 80% stenosis in
 the proximal left anterior descending coronary artery.  FFR was performed demonstrating flow limitat
ion.  Patient was treated with percutaneous coronary intervention of her left anterior descending cor
onary artery using Synergy drug-eluting stent.  Patient was unable to tolerate beta-blocker in the pa
st, secondary to exercise intolerance.

2.  Hypertension.  Patient's blood pressures been well controlled while in the hospital on losartan 2
5 mg daily.

3.  Hyperlipidemia.  Patient has a history of hyperlipidemia.  Her LDL was 58 while on atorvastatin 2
0 mg daily.  We will plan on continuing current therapy.



SUMMARY OF PRESENTATION AND COURSE:  Patient is a 73-year-old female with risk factors, including age
, hypertension, and hyperlipidemia who presented in the outpatient setting with exertional chest tigh
tness.  She had a stress test performed demonstrating a small area of anterior ischemia.  Her Zarate tr
eadmill score placed her at intermediate cardiovascular risk.  She was taken to the cardiac catheteri
zation laboratory for risk stratification.  Coronary angiography was notable for an intermediate to h
igh-grade lesion involving the proximal left anterior descending coronary artery.  This did fit with 
her stress test, but her stress test indicated a small defect where this should be a large defect.  F
FR was performed demonstrating flow limitation.  Patient was treated with percutaneous coronary inter
vention of her left anterior descending coronary artery using Synergy drug-eluting stent.  Her postpr
ocedural course was uncomplicated, and at the time of discharge, she was ambulating without difficult
y.



MEDICATIONS:  Please see medicine reconciliation form.  The patient is not on a beta blocker, seconda
ry to previous intolerance number next.



PHYSICAL EXAM:  GENERAL:  At the time of discharge, patient is resting comfortably in bed.  She did n
ot appear to be in acute distress.  VITAL SIGNS:  Temperature is afebrile.  Pulse is 78, blood pressu
re 107/71, respiratory rate was 15, SaO2 is 98% on room air.  LUNGS:  Clear to auscultation bilateral
ly.  CARDIOVASCULAR:  Regular rate and rhythm.  S1, S2.  No murmurs, rubs, or gallops appreciated.  E
XTREMITIES:  No evidence of hematoma.  Small ecchymosis.  2+ dorsalis pedis and posterior tibial puls
e on the right.  NEURO:  Patient was awake, alert, and oriented.



LABORATORY:  Sodium 136, potassium 4.4, chloride 107, CO2 23, BUN 27, creatinine 1.0.  White blood ce
ll count was 4.94, hemoglobin 11.9, hematocrit 35.8, platelet count was 211.



ARRANGEMENTS FOR FOLLOWUP CARE:  

1.  Patient should participate in cardiac rehab.

2.  Patient should follow up with Dr. Samuel of [facility] in approximately 2 weeks period of time
 for groin check.





Job #:  [id number]